# Patient Record
Sex: MALE | Race: OTHER | URBAN - METROPOLITAN AREA
[De-identification: names, ages, dates, MRNs, and addresses within clinical notes are randomized per-mention and may not be internally consistent; named-entity substitution may affect disease eponyms.]

---

## 2021-07-10 ENCOUNTER — EMERGENCY (EMERGENCY)
Facility: HOSPITAL | Age: 56
LOS: 0 days | Discharge: HOME | End: 2021-07-10
Attending: EMERGENCY MEDICINE
Payer: SELF-PAY

## 2021-07-10 VITALS
RESPIRATION RATE: 16 BRPM | TEMPERATURE: 99 F | OXYGEN SATURATION: 98 % | HEART RATE: 96 BPM | WEIGHT: 250 LBS | HEIGHT: 66 IN | SYSTOLIC BLOOD PRESSURE: 114 MMHG | DIASTOLIC BLOOD PRESSURE: 79 MMHG

## 2021-07-10 VITALS
OXYGEN SATURATION: 100 % | SYSTOLIC BLOOD PRESSURE: 115 MMHG | RESPIRATION RATE: 18 BRPM | TEMPERATURE: 99 F | DIASTOLIC BLOOD PRESSURE: 78 MMHG | HEART RATE: 90 BPM

## 2021-07-10 DIAGNOSIS — F10.129 ALCOHOL ABUSE WITH INTOXICATION, UNSPECIFIED: ICD-10-CM

## 2021-07-10 LAB — GLUCOSE BLDC GLUCOMTR-MCNC: 108 MG/DL — HIGH (ref 70–99)

## 2021-07-10 PROCEDURE — 99284 EMERGENCY DEPT VISIT MOD MDM: CPT

## 2021-07-10 NOTE — ED PROVIDER NOTE - PHYSICAL EXAMINATION
Gen: Alert, NAD, appears intoxicated, laughing   Head: NC, AT, PERRL, normal lids/conjunctiva   ENT: patent oropharynx without erythema/exudate, uvula midline  Neck: supple, no tenderness/meningismus  Pulm: Bilateral clear BS, normal resp effort  CV: RRR, no M/R/G, +dist pulses   Abd: soft, NT/ND, +BS, no guarding/rebound tenderness  Mskel: extremities x4 with normal ROM. no ctl spine ttp. no edema/erythema/cyanosis   Skin: no rash, no bruising  Neuro: AAOx2, no sensory/motor deficits, CN 2-12 intact

## 2021-07-10 NOTE — ED ADULT NURSE REASSESSMENT NOTE - NS ED NURSE REASSESS COMMENT FT1
no signs and symptoms of alcohol withdrawal at this time, CIWA is not warranted at this time as per MD John

## 2021-07-10 NOTE — ED ADULT NURSE NOTE - OBJECTIVE STATEMENT
pt is a 55 year old male, AAX4, presents to the ED with alcohol intoxication, pt states he feels fine and wants to go back to sleep, NAD, all needs are met at this time,

## 2021-07-10 NOTE — ED ADULT NURSE REASSESSMENT NOTE - NS ED NURSE REASSESS COMMENT FT1
Assuming patient's care for coverage. Report received from RN and patient informed during rounding. Assessment available on KBC. Will continue to monitor  received pt in bed 22, , NAD, needs are met

## 2021-07-10 NOTE — ED PROVIDER NOTE - OBJECTIVE STATEMENT
56yo male bibems after being found by bystander sleeping on sidewalk. PT admits to being very drunk. denies fall, head strike, LOC, headache, dizziness. Pt is AOx2.     No fever/chills, No photophobia/eye pain/changes in vision, No ear pain/sore throat/dysphagia, No chest pain/palpitations, no SOB/cough, no wheeze/stridor, No abdominal pain, No N/V/D, no dysuria/frequency/discharge, No neck/back pain, no rash, no changes in neurological status/function.

## 2021-07-10 NOTE — ED PROVIDER NOTE - PATIENT PORTAL LINK FT
You can access the FollowMyHealth Patient Portal offered by [facility] by registering at the following website: [email or web address]. By joining [email or web address]’s FollowMyHealth portal, you will also be able to view your health information using other applications (apps) compatible with our system.

## 2022-02-01 NOTE — ED ADULT NURSE NOTE - CAS DISCH BELONGINGS RETURNED
Discussed AREDS2 supplements, BP Control, UV protection and dark leafy green vegetables. Not applicable

## 2022-12-14 NOTE — ED PROVIDER NOTE - NS ED MD DISPO DISCHARGE
- your thyroid level in October was normal, but closer to the range of a higher dose of thyroid  Repeat level in December was better.     - for now continue Synthroid 88 mcg daily  Repeat labs in February  Send a portal message OR Call about the results if you don't hear in one week:  [phone]  Prompt 4  Prompt 1  And leave a message    - get the [employer] blood pressure machine, arm (not wrist)  Think about portability.     - vitamin D level is very good  Continue vitamin D3, 4000 IU daily    Follow up in one year with repeat labs        
Home